# Patient Record
Sex: FEMALE | Race: WHITE | Employment: FULL TIME | URBAN - METROPOLITAN AREA
[De-identification: names, ages, dates, MRNs, and addresses within clinical notes are randomized per-mention and may not be internally consistent; named-entity substitution may affect disease eponyms.]

---

## 2019-01-14 ENCOUNTER — HOSPITAL ENCOUNTER (OUTPATIENT)
Dept: PHYSICAL THERAPY | Age: 49
Discharge: HOME | End: 2019-01-14
Payer: COMMERCIAL

## 2019-01-14 PROCEDURE — 97162 PT EVAL MOD COMPLEX 30 MIN: CPT

## 2019-01-14 NOTE — THERAPY EVALUATION
John Olivo  : 1970  Primary: [facility]  Secondary:  [facility] at [address]  [phone]   [phone]        OUTPATIENT PHYSICAL THERAPY:Initial Assessment and Daily Note 2019   ICD-10: Treatment Diagnosis: pain in joint, left knee M25.562  ICD-10: Treatment Diagnosis: patellar tendonitis M76.52  ICD-10: Treatment Diagnosis: muscle weakness; generalized M62.81  ICD-10: Treatment Diagnosis: difficulty walking R26.2  Precautions/Allergies:   Patient has no known allergies. MD Orders: evaluate and treat MEDICAL/REFERRING DIAGNOSIS:  Left knee pain [M25.562]   DATE OF ONSET: 18 months  REFERRING PHYSICIAN: Chandler Hamilton., *  RETURN PHYSICIAN APPOINTMENT: as needed     INITIAL ASSESSMENT:  Ms. Fernandez Patton is a 46 y.o. female who presents to physical therapy with chronic left knee pain over the past 18 months. She notes increased swelling and instability in her left knee. She has tried several rounds of injections, however no pain relief noted. 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 She would benefit from skilled physical therapy to improve her overall mobility and function with daily tasks. PROBLEM LIST (Impacting functional limitations):  1. Decreased Strength  2. Decreased ADL/Functional Activities  3. Decreased Transfer Abilities  4. Decreased Ambulation Ability/Technique  5. Decreased Balance  6. Increased Pain  7. Decreased Activity Tolerance  8. Decreased Flexibility/Joint Mobility INTERVENTIONS PLANNED: (Treatment may consist of any combination of the following)  1. Balance Exercise  2. Cold  3. Gait Training  4. Heat  5. Home Exercise Program (HEP)  6. Manual Therapy  7. Neuromuscular Re-education/Strengthening  8. Range of Motion (ROM)  9. Therapeutic Activites  10.  Therapeutic Exercise/Strengthening   TREATMENT PLAN:  Effective Dates: 2019 TO 3/16/2019 (60 days). Frequency/Duration: 2 times a week for 60 Day(s)  GOALS: (Goals have been discussed and agreed upon with patient.)    Discharge Goals: Time Frame: 60 days  1. Patient demonstrates independence with her HEP without verbal cueing from therapist.  2. Patient able to ambulate for 45 minutes without giving way in left knee. 3. Patient able to stand for 1 hour to perform work and home duties with pain no more than 0-2/10 in left knee. 4. Improve LEFS outcome measure score from 64/80 to 74/80 to perform daily tasks. Rehabilitation Potential For Stated Goals: Excellent  Regarding Roya Briones's therapy, I certify that the treatment plan above will be carried out by a therapist or under their direction. Thank you for this referral,  Cristo Maher PT                 The information in this section was collected on 19 (except where otherwise noted). HISTORY:   History of Present Injury/Illness (Reason for Referral): Insidious onset of left knee pain that started 18 months ago. She has noted increased swelling in left knee after prolonged weight bearing, giving way in her knee 1-2x a week and increased pain levels with activities. She has tried cortisone injections, euflexxa injections and durolane injections, however has not received any relief. She is not a surgical candidate at this time. She is most challenged with prolonged standing and walking. She constantly feels pops and cracks in her left knee. Patient denies dizziness, drop attacks, numbness/tingling, bowel/bladder dysfunction and/or unexplained weight gain/loss. Current x-rays and MRI are negative, show arthritic changes. Past Medical History/Comorbidities:   Ms. Jeet Geiger  has a past medical history of Adhesive capsulitis of shoulder and Chronic pain.   Ms. Jeet Geiger  has a past surgical history that includes hx  section (); hx hysterectomy (2002); hx lap cholecystectomy (2001); hx shoulder arthroscopy (2012); MANIPULATION (Right, 8/23/2013); SHOULDER ARTHROSCOPY (Right, 10/18/2012); and MANIPULATION (Right, 2/8/2012). 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 Dominant Side:         RIGHT     Ambulatory/Rehab Services H2 Model Falls Risk Assessment    Risk Factors:       No Risk Factors Identified Ability to Rise from Chair:       (1)  Pushes up, successful in one attempt    Falls Prevention Plan:       No modifications necessary   Total: (5 or greater = High Risk): 1    ©2010 [facility] [employer]. All Rights Reserved. [facility] Patent #[id number]. Federal Law prohibits the replication, distribution or use without written permission from [facility] [employer]     Current Medications:       Current Outpatient Medications:     Naproxen-Esomeprazole Mag (VIMOVO) 500-20 mg TbID, Take 1 Tab by mouth two (2) times daily as needed. , Disp: , Rfl:     cyclobenzaprine (FLEXERIL) 10 mg tablet, Take 10 mg by mouth nightly., Disp: , Rfl:     multivitamin (ONE A DAY) tablet, Take 1 Tab by mouth daily. , Disp: , Rfl:     omega-3 fatty acids-vitamin e (FISH OIL) 1,000 mg cap, Take 1 Cap by mouth.  , Disp: , Rfl:     calcium-vitamin D (OYSTER SHELL) 500 mg(1,250mg) -200 unit per tablet, Take 1 Tab by mouth two (2) times daily (with meals). , Disp: , Rfl:    Date Last Reviewed:  1/14/2019   Number of Personal Factors/Comorbidities that affect the Plan of Care: 1-2: MODERATE COMPLEXITY   EXAMINATION:   Observation/Orthostatic Postural Assessment:           Lower extremity weight bearing is slight increased right.  Observation of gait indicates antalgic gait with limited stance phase left, bilateral pelvic rotation, left greater than right. Patient exhibits a increased lumbar lordosis and neutral thoracic kyphosis. Palpation of lower quadrant bony landmarks are left iliac crest elevated, level greater trochanters. Knee alignment is mild genu valgus and recurvatum left, left femur internally rotated. Observation of patellar position indicates lateral tilt. Patellar tracking with short knee bend indicates left knee tracts laterally. Medial longitudinal arch is slight elevated bilaterally. . Functional and full squat exhibits increased strain to anterior aspect of bilateral patella, no posterior weight distribution. Single leg stand exhibits increased left femur internal rotation. Soft tissue observation indicates restrictions in bilateral iliopsoas, left rectus femoris, bilateral hamstrings. Palpation: Patient exhibits tenderness to palpation left lateral knee. In modified Abdullahi position, muscle length of tensor fascia lázaro (TFL)  is restricted left; muscle length of iliopsoas is restricted bilateraly; and muscle length of rectus femoris is restricted left greater than right. Hamstring flexibility tested supine with straight leg raise is restricted bilaterally to 50 degrees. Piriformis flexibility is restricted left greater than right. Quadriceps flexibility tested heel to buttock is restricted left greater than right. Muscle length of gastrocnemius is mild restrictions left. Muscle length of soleus is mild restrictions left. ROM:   Passive Accessory Mobility Testing: Patellofemoral mobility is limited in medial/lateral glides.    AROM(PROM) Right Left   Knee flexion 0-130 0-125   Knee extension 0 0   Hip flexion 110 100   Hip external rotation (ER) 25 20   Hip internal rotation (IR) 20 15   Hip extension 5 5     Strength:     Manual Muscle Test (*/5) Right Left   Knee extension 4 4-   Knee flexion 4+ 4-   Hip flexion 4 4-   Hip ER 4 4   Hip IR 4 4   Hip extension 4- 3+   Hip abduction 4- 3+   Hip adduction 5 5   Ankle DF 5 5   Ankle PF 5 5            Special Tests:    Ligament stress tests performed today of the left knee include:  Valgus Stress Test at 0 and 30 degrees for medial instability: negative. Varus Stress Test at 0 and 30 degrees for lateral instability: negative. One-plane anterior stress for anterior instability: Lachman Test - negative. Meniscal testing performed today of the left knee include: Ally's - negative. Frank Holly test is negative. Scour test is negative. Neurological Screen:  Myotomes: Key muscle strength testing for bilateral LE is intact. Dermatomes: Sensation testing through bilateral LE for light touch is intact. Reflexes: Patellar (L4) and achilles (S1) are not tested today. Neural tension tests: Slump test is negative. Passive straight leg raise (SLR) test is negative. Functional Mobility:  Challenged with prolonged weight bearing, ambulation and ascending/descending stairs. Body Structures Involved:  1. Joints  2. Muscles Body Functions Affected:  1. Sensory/Pain  2. Neuromusculoskeletal  3. Movement Related Activities and Participation Affected:  1. Mobility  2. Self Care  3. Interpersonal Interactions and Relationships  4. Community, Social and [place]   Number of elements (examined above) that affect the Plan of Care: 3: MODERATE COMPLEXITY   CLINICAL PRESENTATION:   Presentation: Evolving clinical presentation with changing clinical characteristics: MODERATE COMPLEXITY   CLINICAL DECISION MAKING:   Outcome Measure: Tool Used: Lower Extremity Functional Scale (LEFS)  Score:  Initial: 64/80 Most Recent: X/80 (Date: -- )   Interpretation of Score: 20 questions each scored on a 5 point scale with 0 representing \"extreme difficulty or unable to perform\" and 4 representing \"no difficulty\". The lower the score, the greater the functional disability. 80/80 represents no disability.   Minimal detectable change is 9 points. Medical Necessity:   · Patient is expected to demonstrate progress in strength, range of motion, balance, coordination and functional technique to increase independence with ability to perform more activities in weight bearing and ambulate without pain or giving way of her left LE. Esa Mcelroy Reason for Services/Other Comments:  · Patient continues to require skilled intervention due to challenged with daily left knee pain and limitations to perform daily tasks and ambulation secondary to pain and instability in left knee. .   Use of outcome tool(s) and clinical judgement create a POC that gives a: Questionable prediction of patient's progress: MODERATE COMPLEXITY            TREATMENT:   (In addition to Assessment/Re-Assessment sessions the following treatments were rendered)  Pre-treatment Symptoms/Complaints:  Patient looking forward to therapy for pain relief and increased strength and endurance in her LE. Pain: Initial:   Pain Intensity 1: 8  Pain Location 1: Knee  Pain Orientation 1: Left  Post Session:  7/10     Therapeutic Exercise: (5 Minutes):  Exercises per grid below to improve mobility, strength, balance and coordination. Required moderate verbal and manual cues to promote proper body alignment, promote proper body posture, promote proper body mechanics and promote proper body breathing techniques. Progressed resistance, range, repetitions and complexity of movement as indicated. Date:  1/14/2019   Activity/Exercise Parameters   Quadriceps sets X 5 reps 5 sec holds   Straight leg raise X x 10 rep, 5 sec holds   Bilateral hamstrings active stretch X 5 reps, 10 second ankle pumps. Manual Therapy (    Soft Tissue Mobilization Duration  Duration: 5 Minutes): Manual techniques to facilitate improved motion and decreased pain.  (Used abbreviations: MET - muscle energy technique; PNF - proprioceptive neuromuscular facilitation; NMR - neuromuscular re-education; a/p - anterior to posterior; p/a - posterior to anterior; FMP - functional movement patterns)   · Supine left anterior quadriceps, superficial fascia with FMP of knee extension  · Supine left patella mobilizations in all directions. [employer] Portal  Treatment/Session Assessment:    · Response to Treatment:  Improved awareness of plan of care for physical therapy, demonstrates exercises well. Secondary to poor activation of neuro-muscular control and core activation, patient would benefit from manual therapy techniques and neuromuscular re-education as well as strength and endurance training. · Compliance with Program/Exercises: Compliant all of the time, Will assess as treatment progresses. · Recommendations/Intent for next treatment session: \"Next visit will focus on advancements to more challenging activities\". Total Treatment Duration: 60 minutes: 50 minutes evaluation and 10 minutes treatment.   PT Patient Time In/Time Out  Time In: 1145  Time Out: [address], PT    Future Appointments   Date Time Provider Avery Jhaveri   1/17/2019 11:30 AM Nicola ALCANTARA, PT [facility]   1/29/2019  7:30 AM Nicola ALCANTARA, PT [facility]   2/4/2019  9:30 AM Nash Egan, PT [facility]   2/6/2019  7:30 AM Nash Egan, PT [facility]

## 2019-01-17 ENCOUNTER — HOSPITAL ENCOUNTER (OUTPATIENT)
Dept: PHYSICAL THERAPY | Age: 49
Discharge: HOME | End: 2019-01-17
Payer: COMMERCIAL

## 2019-01-17 PROCEDURE — 97140 MANUAL THERAPY 1/> REGIONS: CPT

## 2019-01-17 PROCEDURE — 97110 THERAPEUTIC EXERCISES: CPT

## 2019-01-17 NOTE — PROGRESS NOTES
Breanne Jones : 1970 Primary: [facility] Secondary:  Therapy Center at [address] [phone]   [phone] OUTPATIENT PHYSICAL THERAPY:Daily Note 2019 ICD-10: Treatment Diagnosis: pain in joint, left knee M25.562 ICD-10: Treatment Diagnosis: patellar tendonitis M76.52 
ICD-10: Treatment Diagnosis: muscle weakness; generalized M62.81 ICD-10: Treatment Diagnosis: difficulty walking R26.2 Precautions/Allergies:  
Patient has no known allergies. MD Orders: evaluate and treat MEDICAL/REFERRING DIAGNOSIS: 
Left knee pain [M25.562] DATE OF ONSET: 18 months REFERRING PHYSICIAN: Lon Herrera., * RETURN PHYSICIAN APPOINTMENT: as needed INITIAL ASSESSMENT:  Ms. Mary Mendoza is a 46 y.o. female who presents to physical therapy with chronic left knee pain over the past 18 months. She notes increased swelling and instability in her left knee. She has tried several rounds of injections, however no pain relief noted. 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 She would benefit from skilled physical therapy to improve her overall mobility and function with daily tasks. PROBLEM LIST (Impacting functional limitations): 1. Decreased Strength 2. Decreased ADL/Functional Activities 3. Decreased Transfer Abilities 4. Decreased Ambulation Ability/Technique 5. Decreased Balance 6. Increased Pain 7. Decreased Activity Tolerance 8. Decreased Flexibility/Joint Mobility INTERVENTIONS PLANNED: (Treatment may consist of any combination of the following) 1. Balance Exercise 2. Cold 3. Gait Training 4. Heat 5. Home Exercise Program (HEP) 6. Manual Therapy 7. Neuromuscular Re-education/Strengthening 8. Range of Motion (ROM) 9. Therapeutic Activites 10. Therapeutic Exercise/Strengthening TREATMENT PLAN: 
Effective Dates: 1/14/2019 TO 3/16/2019 (60 days). Frequency/Duration: 2 times a week for 60 Day(s) GOALS: (Goals have been discussed and agreed upon with patient.) Discharge Goals: Time Frame: 60 days 1. Patient demonstrates independence with her HEP without verbal cueing from therapist. 
2. Patient able to ambulate for 45 minutes without giving way in left knee. 3. Patient able to stand for 1 hour to perform work and home duties with pain no more than 0-2/10 in left knee. 4. Improve LEFS outcome measure score from 64/80 to 74/80 to perform daily tasks. Rehabilitation Potential For Stated Goals: Excellent Regarding Boo Briones's therapy, I certify that the treatment plan above will be carried out by a therapist or under their direction. Thank you for this referral, Jenniffer Kowalski PT The information in this section was collected on 1/14/19 (except where otherwise noted). HISTORY:  
History of Present Injury/Illness (Reason for Referral): Insidious onset of left knee pain that started 18 months ago. She has noted increased swelling in left knee after prolonged weight bearing, giving way in her knee 1-2x a week and increased pain levels with activities. She has tried cortisone injections, euflexxa injections and durolane injections, however has not received any relief. She is not a surgical candidate at this time. She is most challenged with prolonged standing and walking. She constantly feels pops and cracks in her left knee. Patient denies dizziness, drop attacks, numbness/tingling, bowel/bladder dysfunction and/or unexplained weight gain/loss. Current x-rays and MRI are negative, show arthritic changes. Past Medical History/Comorbidities: Ms. Kushal Armendariz  has a past medical history of Adhesive capsulitis of shoulder (10/18/2012) and Chronic pain.  She also has no past medical history of Aneurysm ([address] Arrhythmia, Asthma, Autoimmune disease ([address] CAD (coronary artery disease), Cancer ([address] Chronic kidney disease, Coagulation defects, COPD, Diabetes ([address] Difficult intubation, GERD (gastroesophageal reflux disease), Heart failure ([address] Hypertension, Liver disease, Malignant hyperthermia due to anesthesia, Morbid obesity ([address] Nausea & vomiting, Other ill-defined conditions(799.89), Pseudocholinesterase deficiency, Psychiatric disorder, PUD (peptic ulcer disease), Seizures ([address] Stroke ([address] Thromboembolus ([address] Thyroid disease, Unspecified adverse effect of anesthesia, or Unspecified sleep apnea. Ms. Samantha Wheeler  has a past surgical history that includes hx  section (); hx hysterectomy (); hx lap cholecystectomy (); and hx shoulder arthroscopy (). Social History/Living Environment:  
  Lives in a private home independently, challenged with prolonged weight bearing activities around the house and difficulty with stairs Prior Level of Function/Work/Activity: 
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 Dominant Side:  
      RIGHT Ambulatory/Rehab Services H2 Model Falls Risk Assessment Risk Factors: 
     No Risk Factors Identified Ability to Rise from Chair: 
     (1)  Pushes up, successful in one attempt Falls Prevention Plan: No modifications necessary Total: (5 or greater = High Risk): 1  [facility] [place] Patent #[id number]. Federal Law prohibits the replication, distribution or use without written permission from [facility] [employer] Current Medications:   
  
Current Outpatient Medications:  
  Naproxen-Esomeprazole Mag (VIMOVO) 500-20 mg TbID, Take 1 Tab by mouth two (2) times daily as needed. , Disp: , Rfl:  
  cyclobenzaprine (FLEXERIL) 10 mg tablet, Take 10 mg by mouth nightly., Disp: , Rfl:  
   multivitamin (ONE A DAY) tablet, Take 1 Tab by mouth daily. , Disp: , Rfl:  
  omega-3 fatty acids-vitamin e (FISH OIL) 1,000 mg cap, Take 1 Cap by mouth.  , Disp: , Rfl:  
  calcium-vitamin D (OYSTER SHELL) 500 mg(1,250mg) -200 unit per tablet, Take 1 Tab by mouth two (2) times daily (with meals). , Disp: , Rfl:   
Date Last Reviewed:  1/17/2019 Number of Personal Factors/Comorbidities that affect the Plan of Care: 1-2: MODERATE COMPLEXITY EXAMINATION:  
Observation/Orthostatic Postural Assessment:   
       Lower extremity weight bearing is slight increased right. Observation of gait indicates antalgic gait with limited stance phase left, bilateral pelvic rotation, left greater than right. Patient exhibits a increased lumbar lordosis and neutral thoracic kyphosis. Palpation of lower quadrant bony landmarks are left iliac crest elevated, level greater trochanters. Knee alignment is mild genu valgus and recurvatum left, left femur internally rotated. Observation of patellar position indicates lateral tilt. Patellar tracking with short knee bend indicates left knee tracts laterally. Medial longitudinal arch is slight elevated bilaterally. . Functional and full squat exhibits increased strain to anterior aspect of bilateral patella, no posterior weight distribution. Single leg stand exhibits increased left femur internal rotation. Soft tissue observation indicates restrictions in bilateral iliopsoas, left rectus femoris, bilateral hamstrings. Palpation: Patient exhibits tenderness to palpation left lateral knee. In modified Abdullahi position, muscle length of tensor fascia lázaro (TFL)  is restricted left; muscle length of iliopsoas is restricted bilateraly; and muscle length of rectus femoris is restricted left greater than right. Hamstring flexibility tested supine with straight leg raise is restricted bilaterally to 50 degrees.  Piriformis flexibility is restricted left greater than right. Quadriceps flexibility tested heel to buttock is restricted left greater than right. Muscle length of gastrocnemius is mild restrictions left. Muscle length of soleus is mild restrictions left. ROM:   Passive Accessory Mobility Testing: Patellofemoral mobility is limited in medial/lateral glides. AROM(PROM) Right Left Knee flexion 0-130 0-125 Knee extension 0 0 Hip flexion 110 100 Hip external rotation (ER) 25 20 Hip internal rotation (IR) 20 15 Hip extension 5 5 Strength:    
Manual Muscle Test (*/5) Right Left Knee extension 4 4-  
Knee flexion 4+ 4- Hip flexion 4 4- Hip ER 4 4 Hip IR 4 4 Hip extension 4- 3+ Hip abduction 4- 3+ Hip adduction 5 5 Ankle DF 5 5 Ankle PF 5 5 Special Tests:   
Ligament stress tests performed today of the left knee include: 
Valgus Stress Test at 0 and 30 degrees for medial instability: negative. Varus Stress Test at 0 and 30 degrees for lateral instability: negative. One-plane anterior stress for anterior instability: Lachman Test - negative. Meniscal testing performed today of the left knee include: Ally's - negative. Manus Baba test is negative. Scour test is negative. Neurological Screen: Myotomes: Key muscle strength testing for bilateral LE is intact. Dermatomes: Sensation testing through bilateral LE for light touch is intact. Reflexes: Patellar (L4) and achilles (S1) are not tested today. Neural tension tests: Slump test is negative. Passive straight leg raise (SLR) test is negative. Functional Mobility:  Challenged with prolonged weight bearing, ambulation and ascending/descending stairs. Body Structures Involved: 1. Joints 2. Muscles Body Functions Affected: 1. Sensory/Pain 2. Neuromusculoskeletal 
3. Movement Related Activities and Participation Affected: 1. Mobility 2. Self Care 3. Interpersonal Interactions and Relationships 4. Community, Social and [place] Number of elements (examined above) that affect the Plan of Care: 3: MODERATE COMPLEXITY CLINICAL PRESENTATION:  
Presentation: Evolving clinical presentation with changing clinical characteristics: MODERATE COMPLEXITY CLINICAL DECISION MAKING:  
Outcome Measure: Tool Used: Lower Extremity Functional Scale (LEFS) Score:  Initial: 64/80 Most Recent: X/80 (Date: -- ) Interpretation of Score: 20 questions each scored on a 5 point scale with 0 representing \"extreme difficulty or unable to perform\" and 4 representing \"no difficulty\". The lower the score, the greater the functional disability. 80/80 represents no disability. Minimal detectable change is 9 points. Medical Necessity:  
· Patient is expected to demonstrate progress in strength, range of motion, balance, coordination and functional technique to increase independence with ability to perform more activities in weight bearing and ambulate without pain or giving way of her left LE. Saumya Cool Reason for Services/Other Comments: 
· Patient continues to require skilled intervention due to challenged with daily left knee pain and limitations to perform daily tasks and ambulation secondary to pain and instability in left knee. Saumya Cool Use of outcome tool(s) and clinical judgement create a POC that gives a: Questionable prediction of patient's progress: MODERATE COMPLEXITY  
  
 
 
 
TREATMENT:  
(In addition to Assessment/Re-Assessment sessions the following treatments were rendered) Pre-treatment Symptoms/Complaints:  Patient notes left patella and lateral knee continue to have sharp pains with stairs. Pain: Initial:  
Pain Intensity 1: 7 Pain Location 1: Knee Pain Orientation 1: Left  Post Session:  6/10 Therapeutic Exercise: (30 Minutes):  Exercises per grid below to improve mobility, strength, balance and coordination.   Required moderate verbal and manual cues to promote proper body alignment, promote proper body posture, promote proper body mechanics and promote proper body breathing techniques. Progressed resistance, range, repetitions and complexity of movement as indicated. Date: 
1/17/2019 Activity/Exercise Parameters Quadriceps sets X 5 reps 5 sec holds Straight leg raise X x 10 rep, 5 sec holds Bilateral hamstrings active stretch X 5 reps, 10 second ankle pumps. Kinesiotaping X 5 minute application to left knee for patella support Bridging 2x10 reps, 5 sec holds Side lying clam shells 2x10 reps, 5 sec holds Standing forward weight shift X 10 reps, 5 sec holds Manual Therapy (    Soft Tissue Mobilization Duration Duration: 30 Minutes): Manual techniques to facilitate improved motion and decreased pain. (Used abbreviations: MET - muscle energy technique; PNF - proprioceptive neuromuscular facilitation; NMR - neuromuscular re-education; a/p - anterior to posterior; p/a - posterior to anterior; FMP - functional movement patterns) · Supine left anterior quadriceps, superficial fascia with FMP of knee extension · Supine left patella mobilizations in all directions. · Supine left IT band and vastus lateralis soft tissue mobilization with FMP of knee flexion/extension [employer] [facility] 
Treatment/Session Assessment:   
· Response to Treatment:  Improved mobility in weight bearing with use of kinesiotape to left patella, less sharp pain noted. · Compliance with Program/Exercises: Compliant all of the time, Will assess as treatment progresses. · Recommendations/Intent for next treatment session: \"Next visit will focus on advancements to more challenging activities\". Total Treatment Duration: 60 minutes PT Patient Time In/Time Out Time In: 1130 Time Out: 1230 Derrick Mccarthy PT Future Appointments Date Time Provider Avery Jhaveri 1/29/2019  7:30 AM RHEA Hernández  
2/4/2019  9:30 AM Kaveh Hernadez PT JOAN [facility] 2/6/2019  7:30 AM Vaishnavi Lambert, PT [facility]

## 2019-01-29 ENCOUNTER — HOSPITAL ENCOUNTER (OUTPATIENT)
Dept: PHYSICAL THERAPY | Age: 49
Discharge: HOME | End: 2019-01-29
Payer: COMMERCIAL

## 2019-01-29 PROCEDURE — 97140 MANUAL THERAPY 1/> REGIONS: CPT

## 2019-01-29 PROCEDURE — 97110 THERAPEUTIC EXERCISES: CPT

## 2019-01-29 NOTE — PROGRESS NOTES
Erlindathania Eli : 1970 Primary: [facility] [employer] [facility]* Secondary:  Therapy Center at [address] [phone]   [phone] OUTPATIENT PHYSICAL THERAPY:Daily Note 2019 ICD-10: Treatment Diagnosis: pain in joint, left knee M25.562 ICD-10: Treatment Diagnosis: patellar tendonitis M76.52 
ICD-10: Treatment Diagnosis: muscle weakness; generalized M62.81 ICD-10: Treatment Diagnosis: difficulty walking R26.2 Precautions/Allergies:  
Patient has no known allergies. MD Orders: evaluate and treat MEDICAL/REFERRING DIAGNOSIS: 
Left knee pain [M25.562] DATE OF ONSET: 18 months REFERRING PHYSICIAN: Gela Maldonado., * RETURN PHYSICIAN APPOINTMENT: as needed INITIAL ASSESSMENT:  Ms. Adelso Bocanegra is a 46 y.o. female who presents to physical therapy with chronic left knee pain over the past 18 months. She notes increased swelling and instability in her left knee. She has tried several rounds of injections, however no pain relief noted. 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 She would benefit from skilled physical therapy to improve her overall mobility and function with daily tasks. PROBLEM LIST (Impacting functional limitations): 1. Decreased Strength 2. Decreased ADL/Functional Activities 3. Decreased Transfer Abilities 4. Decreased Ambulation Ability/Technique 5. Decreased Balance 6. Increased Pain 7. Decreased Activity Tolerance 8. Decreased Flexibility/Joint Mobility INTERVENTIONS PLANNED: (Treatment may consist of any combination of the following) 1. Balance Exercise 2. Cold 3. Gait Training 4. Heat 5. Home Exercise Program (HEP) 6. Manual Therapy 7. Neuromuscular Re-education/Strengthening 8. Range of Motion (ROM) 9. Therapeutic Activites 10. Therapeutic Exercise/Strengthening TREATMENT PLAN: 
Effective Dates: 1/14/2019 TO 3/16/2019 (60 days). Frequency/Duration: 2 times a week for 60 Day(s) GOALS: (Goals have been discussed and agreed upon with patient.) Discharge Goals: Time Frame: 60 days 1. Patient demonstrates independence with her HEP without verbal cueing from therapist. 
2. Patient able to ambulate for 45 minutes without giving way in left knee. 3. Patient able to stand for 1 hour to perform work and home duties with pain no more than 0-2/10 in left knee. 4. Improve LEFS outcome measure score from 64/80 to 74/80 to perform daily tasks. Rehabilitation Potential For Stated Goals: Excellent Regarding Karon Briones's therapy, I certify that the treatment plan above will be carried out by a therapist or under their direction. Thank you for this referral, Caro Reyes PT The information in this section was collected on 1/14/19 (except where otherwise noted). HISTORY:  
History of Present Injury/Illness (Reason for Referral): Insidious onset of left knee pain that started 18 months ago. She has noted increased swelling in left knee after prolonged weight bearing, giving way in her knee 1-2x a week and increased pain levels with activities. She has tried cortisone injections, euflexxa injections and durolane injections, however has not received any relief. She is not a surgical candidate at this time. She is most challenged with prolonged standing and walking. She constantly feels pops and cracks in her left knee. Patient denies dizziness, drop attacks, numbness/tingling, bowel/bladder dysfunction and/or unexplained weight gain/loss. Current x-rays and MRI are negative, show arthritic changes. Past Medical History/Comorbidities: Ms. Mary Mendoza  has a past medical history of Adhesive capsulitis of shoulder (10/18/2012) and Chronic pain.  She also has no past medical history of Aneurysm ([address] Arrhythmia, Asthma, Autoimmune disease ([address] CAD (coronary artery disease), Cancer ([facility] [address] Chronic kidney disease, Coagulation defects, COPD, Diabetes ([address] Difficult intubation, GERD (gastroesophageal reflux disease), Heart failure ([address] Hypertension, Liver disease, Malignant hyperthermia due to anesthesia, Morbid obesity ([facility] [address] Nausea & vomiting, Other ill-defined conditions(799.89), Pseudocholinesterase deficiency, Psychiatric disorder, PUD (peptic ulcer disease), Seizures ([address] Stroke ([address] Thromboembolus ([facility] [address] Thyroid disease, Unspecified adverse effect of anesthesia, or Unspecified sleep apnea. Ms. Sonia Lopez  has a past surgical history that includes hx  section (); hx hysterectomy (); hx lap cholecystectomy (); and hx shoulder arthroscopy (). Social History/Living Environment:  
  Lives in a private home independently, challenged with prolonged weight bearing activities around the house and difficulty with stairs Prior Level of Function/Work/Activity: 
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 Dominant Side:  
      RIGHT Ambulatory/Rehab Services H2 Model Falls Risk Assessment Risk Factors: 
     No Risk Factors Identified Ability to Rise from Chair: 
     (1)  Pushes up, successful in one attempt Falls Prevention Plan: No modifications necessary Total: (5 or greater = High Risk): 1  [facility] [address] Patent #[id number]. Federal Law prohibits the replication, distribution or use without written permission from [facility] [employer] Current Medications:   
  
Current Outpatient Medications:  
  Naproxen-Esomeprazole Mag (VIMOVO) 500-20 mg TbID, Take 1 Tab by mouth two (2) times daily as needed. , Disp: , Rfl:  
  cyclobenzaprine (FLEXERIL) 10 mg tablet, Take 10 mg by mouth nightly., Disp: , Rfl:  
   multivitamin (ONE A DAY) tablet, Take 1 Tab by mouth daily. , Disp: , Rfl:  
  omega-3 fatty acids-vitamin e (FISH OIL) 1,000 mg cap, Take 1 Cap by mouth.  , Disp: , Rfl:  
  calcium-vitamin D (OYSTER SHELL) 500 mg(1,250mg) -200 unit per tablet, Take 1 Tab by mouth two (2) times daily (with meals). , Disp: , Rfl:   
Date Last Reviewed:  1/29/2019 Number of Personal Factors/Comorbidities that affect the Plan of Care: 1-2: MODERATE COMPLEXITY EXAMINATION:  
Observation/Orthostatic Postural Assessment:   
       Lower extremity weight bearing is slight increased right. Observation of gait indicates antalgic gait with limited stance phase left, bilateral pelvic rotation, left greater than right. Patient exhibits a increased lumbar lordosis and neutral thoracic kyphosis. Palpation of lower quadrant bony landmarks are left iliac crest elevated, level greater trochanters. Knee alignment is mild genu valgus and recurvatum left, left femur internally rotated. Observation of patellar position indicates lateral tilt. Patellar tracking with short knee bend indicates left knee tracts laterally. Medial longitudinal arch is slight elevated bilaterally. . Functional and full squat exhibits increased strain to anterior aspect of bilateral patella, no posterior weight distribution. Single leg stand exhibits increased left femur internal rotation. Soft tissue observation indicates restrictions in bilateral iliopsoas, left rectus femoris, bilateral hamstrings. Palpation: Patient exhibits tenderness to palpation left lateral knee. In modified Abdullahi position, muscle length of tensor fascia lázaro (TFL)  is restricted left; muscle length of iliopsoas is restricted bilateraly; and muscle length of rectus femoris is restricted left greater than right. Hamstring flexibility tested supine with straight leg raise is restricted bilaterally to 50 degrees.  Piriformis flexibility is restricted left greater than right. Quadriceps flexibility tested heel to buttock is restricted left greater than right. Muscle length of gastrocnemius is mild restrictions left. Muscle length of soleus is mild restrictions left. ROM:   Passive Accessory Mobility Testing: Patellofemoral mobility is limited in medial/lateral glides. AROM(PROM) Right Left Knee flexion 0-130 0-125 Knee extension 0 0 Hip flexion 110 100 Hip external rotation (ER) 25 20 Hip internal rotation (IR) 20 15 Hip extension 5 5 Strength:    
Manual Muscle Test (*/5) Right Left Knee extension 4 4-  
Knee flexion 4+ 4- Hip flexion 4 4- Hip ER 4 4 Hip IR 4 4 Hip extension 4- 3+ Hip abduction 4- 3+ Hip adduction 5 5 Ankle DF 5 5 Ankle PF 5 5 Special Tests:   
Ligament stress tests performed today of the left knee include: 
Valgus Stress Test at 0 and 30 degrees for medial instability: negative. Varus Stress Test at 0 and 30 degrees for lateral instability: negative. One-plane anterior stress for anterior instability: Lachman Test - negative. Meniscal testing performed today of the left knee include: Ally's - negative. Patricia Conception test is negative. Scour test is negative. Neurological Screen: Myotomes: Key muscle strength testing for bilateral LE is intact. Dermatomes: Sensation testing through bilateral LE for light touch is intact. Reflexes: Patellar (L4) and achilles (S1) are not tested today. Neural tension tests: Slump test is negative. Passive straight leg raise (SLR) test is negative. Functional Mobility:  Challenged with prolonged weight bearing, ambulation and ascending/descending stairs. Body Structures Involved: 1. Joints 2. Muscles Body Functions Affected: 1. Sensory/Pain 2. Neuromusculoskeletal 
3. Movement Related Activities and Participation Affected: 1. Mobility 2. Self Care 3. Interpersonal Interactions and Relationships 4. Community, Social and [place] Number of elements (examined above) that affect the Plan of Care: 3: MODERATE COMPLEXITY CLINICAL PRESENTATION:  
Presentation: Evolving clinical presentation with changing clinical characteristics: MODERATE COMPLEXITY CLINICAL DECISION MAKING:  
Outcome Measure: Tool Used: Lower Extremity Functional Scale (LEFS) Score:  Initial: 64/80 Most Recent: X/80 (Date: -- ) Interpretation of Score: 20 questions each scored on a 5 point scale with 0 representing \"extreme difficulty or unable to perform\" and 4 representing \"no difficulty\". The lower the score, the greater the functional disability. 80/80 represents no disability. Minimal detectable change is 9 points. Medical Necessity:  
· Patient is expected to demonstrate progress in strength, range of motion, balance, coordination and functional technique to increase independence with ability to perform more activities in weight bearing and ambulate without pain or giving way of her left LE. Viv Rich Reason for Services/Other Comments: 
· Patient continues to require skilled intervention due to challenged with daily left knee pain and limitations to perform daily tasks and ambulation secondary to pain and instability in left knee. Viv Rich Use of outcome tool(s) and clinical judgement create a POC that gives a: Questionable prediction of patient's progress: MODERATE COMPLEXITY  
  
 
 
 
TREATMENT:  
(In addition to Assessment/Re-Assessment sessions the following treatments were rendered) Pre-treatment Symptoms/Complaints:  Patient notes traveled last week for work and noted increased left knee pain and stiffness with prolonged driving and sitting. Pain: Initial:  
Pain Intensity 1: 7 Pain Location 1: Knee Pain Orientation 1: Left  Post Session:  6/10 Therapeutic Exercise: (20 Minutes):  Exercises per grid below to improve mobility, strength, balance and coordination.   Required moderate verbal and manual cues to promote proper body alignment, promote proper body posture, promote proper body mechanics and promote proper body breathing techniques. Progressed resistance, range, repetitions and complexity of movement as indicated. Date: 
1/29/2019 Activity/Exercise Parameters Quadriceps sets X 5 reps 5 sec holds Straight leg raise X x 10 rep, 5 sec holds Bilateral hamstrings active stretch X 5 reps, 10 second ankle pumps. Kinesiotaping Bridging 2x10 reps, 5 sec holds Side lying clam shells 2x10 reps, 5 sec holds Standing forward weight shift X 10 reps, 5 sec holds Side lying hip abduction X 15 reps, 5 sec holds BLE Manual Therapy (    Soft Tissue Mobilization Duration Duration: 25 Minutes): Manual techniques to facilitate improved motion and decreased pain. (Used abbreviations: MET - muscle energy technique; PNF - proprioceptive neuromuscular facilitation; NMR - neuromuscular re-education; a/p - anterior to posterior; p/a - posterior to anterior; FMP - functional movement patterns) · Supine left anterior quadriceps, superficial fascia with FMP of knee extension · Supine left patella mobilizations in all directions. · Supine bilateral iliopsoas release with FMP of knee extension · Supine left IT band and vastus lateralis soft tissue mobilization with FMP of knee flexion/extension [employer] [facility] 
Treatment/Session Assessment:   
· Response to Treatment:  Improved mobility noted in lateral left thigh, improved patella mobility, demonstrates weakness through gluteus medius/minimus · Compliance with Program/Exercises: Compliant all of the time, Will assess as treatment progresses. · Recommendations/Intent for next treatment session: \"Next visit will focus on advancements to more challenging activities\". Total Treatment Duration: 45 minutes PT Patient Time In/Time Out Time In: 0745 Time Out: 0830 Lilia Carrel, PT Future Appointments Date Time Provider Avery Jhaveri 2/4/2019  9:30 AM Christel Lambert., PT [facility] 2/6/2019  7:30 AM William Lambert., PT [facility]

## 2019-01-31 ENCOUNTER — APPOINTMENT (OUTPATIENT)
Dept: PHYSICAL THERAPY | Age: 49
End: 2019-01-31
Payer: COMMERCIAL

## 2019-02-04 ENCOUNTER — HOSPITAL ENCOUNTER (OUTPATIENT)
Dept: PHYSICAL THERAPY | Age: 49
Discharge: HOME | End: 2019-02-04
Payer: COMMERCIAL

## 2019-02-04 PROCEDURE — 97140 MANUAL THERAPY 1/> REGIONS: CPT

## 2019-02-04 PROCEDURE — 97110 THERAPEUTIC EXERCISES: CPT

## 2019-02-04 NOTE — PROGRESS NOTES
Deborah  : 1970 Primary: [facility] Secondary:  Therapy Center at [address] [place] [address], [address] [place] [address] [phone]   [phone] OUTPATIENT PHYSICAL THERAPY:Daily Note 2019 ICD-10: Treatment Diagnosis: pain in joint, left knee M25.562 ICD-10: Treatment Diagnosis: patellar tendonitis M76.52 
ICD-10: Treatment Diagnosis: muscle weakness; generalized M62.81 ICD-10: Treatment Diagnosis: difficulty walking R26.2 Precautions/Allergies:  
Patient has no known allergies. MD Orders: evaluate and treat MEDICAL/REFERRING DIAGNOSIS: 
Left knee pain [M25.562] DATE OF ONSET: 18 months REFERRING PHYSICIAN: Lorenzo Singh., * RETURN PHYSICIAN APPOINTMENT: as needed INITIAL ASSESSMENT:  Ms. Christopher Winslow is a 46 y.o. female who presents to physical therapy with chronic left knee pain over the past 18 months. She notes increased swelling and instability in her left knee. She has tried several rounds of injections, however no pain relief noted. 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 She would benefit from skilled physical therapy to improve her overall mobility and function with daily tasks. PROBLEM LIST (Impacting functional limitations): 1. Decreased Strength 2. Decreased ADL/Functional Activities 3. Decreased Transfer Abilities 4. Decreased Ambulation Ability/Technique 5. Decreased Balance 6. Increased Pain 7. Decreased Activity Tolerance 8. Decreased Flexibility/Joint Mobility INTERVENTIONS PLANNED: (Treatment may consist of any combination of the following) 1. Balance Exercise 2. Cold 3. Gait Training 4. Heat 5. Home Exercise Program (HEP) 6. Manual Therapy 7. Neuromuscular Re-education/Strengthening 8. Range of Motion (ROM) 9. Therapeutic Activites 10. Therapeutic Exercise/Strengthening TREATMENT PLAN: 
Effective Dates: 1/14/2019 TO 3/16/2019 (60 days). Frequency/Duration: 2 times a week for 60 Day(s) GOALS: (Goals have been discussed and agreed upon with patient.) Discharge Goals: Time Frame: 60 days 1. Patient demonstrates independence with her HEP without verbal cueing from therapist. 
2. Patient able to ambulate for 45 minutes without giving way in left knee. 3. Patient able to stand for 1 hour to perform work and home duties with pain no more than 0-2/10 in left knee. 4. Improve LEFS outcome measure score from 64/80 to 74/80 to perform daily tasks. Rehabilitation Potential For Stated Goals: Excellent Regarding Tremaine Briones's therapy, I certify that the treatment plan above will be carried out by a therapist or under their direction. Thank you for this referral, Jadyn Garibay, PT The information in this section was collected on 1/14/19 (except where otherwise noted). HISTORY:  
History of Present Injury/Illness (Reason for Referral): Insidious onset of left knee pain that started 18 months ago. She has noted increased swelling in left knee after prolonged weight bearing, giving way in her knee 1-2x a week and increased pain levels with activities. She has tried cortisone injections, euflexxa injections and durolane injections, however has not received any relief. She is not a surgical candidate at this time. She is most challenged with prolonged standing and walking. She constantly feels pops and cracks in her left knee. Patient denies dizziness, drop attacks, numbness/tingling, bowel/bladder dysfunction and/or unexplained weight gain/loss. Current x-rays and MRI are negative, show arthritic changes. Past Medical History/Comorbidities: Ms. Luz Maria Rodriguez  has a past medical history of Adhesive capsulitis of shoulder (10/18/2012) and Chronic pain.  She also has no past medical history of Aneurysm ([address] Arrhythmia, Asthma, Autoimmune disease ([address] CAD (coronary artery disease), Cancer ([address] Chronic kidney disease, Coagulation defects, COPD, Diabetes ([address] Difficult intubation, GERD (gastroesophageal reflux disease), Heart failure ([address] Hypertension, Liver disease, Malignant hyperthermia due to anesthesia, Morbid obesity ([address] Nausea & vomiting, Other ill-defined conditions(799.89), Pseudocholinesterase deficiency, Psychiatric disorder, PUD (peptic ulcer disease), Seizures ([address] Stroke ([address] Thromboembolus ([address] Thyroid disease, Unspecified adverse effect of anesthesia, or Unspecified sleep apnea. Ms. Kishan Snow  has a past surgical history that includes hx  section (); hx hysterectomy (); hx lap cholecystectomy (); and hx shoulder arthroscopy (). Social History/Living Environment:  
  Lives in a private home independently, challenged with prolonged weight bearing activities around the house and difficulty with stairs Prior Level of Function/Work/Activity: 
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 Dominant Side:  
      RIGHT Ambulatory/Rehab Services H2 Model Falls Risk Assessment Risk Factors: 
     No Risk Factors Identified Ability to Rise from Chair: 
     (1)  Pushes up, successful in one attempt Falls Prevention Plan: No modifications necessary Total: (5 or greater = High Risk): 1  [facility] [address] Patent #[id number]. Federal Law prohibits the replication, distribution or use without written permission from [facility] [employer] Current Medications:   
  
Current Outpatient Medications:  
  Naproxen-Esomeprazole Mag (VIMOVO) 500-20 mg TbID, Take 1 Tab by mouth two (2) times daily as needed. , Disp: , Rfl:  
  cyclobenzaprine (FLEXERIL) 10 mg tablet, Take 10 mg by mouth nightly., Disp: , Rfl:  
   multivitamin (ONE A DAY) tablet, Take 1 Tab by mouth daily. , Disp: , Rfl:  
  omega-3 fatty acids-vitamin e (FISH OIL) 1,000 mg cap, Take 1 Cap by mouth.  , Disp: , Rfl:  
  calcium-vitamin D (OYSTER SHELL) 500 mg(1,250mg) -200 unit per tablet, Take 1 Tab by mouth two (2) times daily (with meals). , Disp: , Rfl:   
Date Last Reviewed:  2/4/2019 Number of Personal Factors/Comorbidities that affect the Plan of Care: 1-2: MODERATE COMPLEXITY EXAMINATION:  
Observation/Orthostatic Postural Assessment:   
       Lower extremity weight bearing is slight increased right. Observation of gait indicates antalgic gait with limited stance phase left, bilateral pelvic rotation, left greater than right. Patient exhibits a increased lumbar lordosis and neutral thoracic kyphosis. Palpation of lower quadrant bony landmarks are left iliac crest elevated, level greater trochanters. Knee alignment is mild genu valgus and recurvatum left, left femur internally rotated. Observation of patellar position indicates lateral tilt. Patellar tracking with short knee bend indicates left knee tracts laterally. Medial longitudinal arch is slight elevated bilaterally. . Functional and full squat exhibits increased strain to anterior aspect of bilateral patella, no posterior weight distribution. Single leg stand exhibits increased left femur internal rotation. Soft tissue observation indicates restrictions in bilateral iliopsoas, left rectus femoris, bilateral hamstrings. Palpation: Patient exhibits tenderness to palpation left lateral knee. In modified Abdullahi position, muscle length of tensor fascia lázaro (TFL)  is restricted left; muscle length of iliopsoas is restricted bilateraly; and muscle length of rectus femoris is restricted left greater than right. Hamstring flexibility tested supine with straight leg raise is restricted bilaterally to 50 degrees.  Piriformis flexibility is restricted left greater than right. Quadriceps flexibility tested heel to buttock is restricted left greater than right. Muscle length of gastrocnemius is mild restrictions left. Muscle length of soleus is mild restrictions left. ROM:   Passive Accessory Mobility Testing: Patellofemoral mobility is limited in medial/lateral glides. AROM(PROM) Right Left Knee flexion 0-130 0-125 Knee extension 0 0 Hip flexion 110 100 Hip external rotation (ER) 25 20 Hip internal rotation (IR) 20 15 Hip extension 5 5 Strength:    
Manual Muscle Test (*/5) Right Left Knee extension 4 4-  
Knee flexion 4+ 4- Hip flexion 4 4- Hip ER 4 4 Hip IR 4 4 Hip extension 4- 3+ Hip abduction 4- 3+ Hip adduction 5 5 Ankle DF 5 5 Ankle PF 5 5 Special Tests:   
Ligament stress tests performed today of the left knee include: 
Valgus Stress Test at 0 and 30 degrees for medial instability: negative. Varus Stress Test at 0 and 30 degrees for lateral instability: negative. One-plane anterior stress for anterior instability: Lachman Test - negative. Meniscal testing performed today of the left knee include: Ally's - negative. Esmeralda Shed test is negative. Scour test is negative. Neurological Screen: Myotomes: Key muscle strength testing for bilateral LE is intact. Dermatomes: Sensation testing through bilateral LE for light touch is intact. Reflexes: Patellar (L4) and achilles (S1) are not tested today. Neural tension tests: Slump test is negative. Passive straight leg raise (SLR) test is negative. Functional Mobility:  Challenged with prolonged weight bearing, ambulation and ascending/descending stairs. Body Structures Involved: 1. Joints 2. Muscles Body Functions Affected: 1. Sensory/Pain 2. Neuromusculoskeletal 
3. Movement Related Activities and Participation Affected: 1. Mobility 2. Self Care 3. Interpersonal Interactions and Relationships 4. Community, Social and [place] Number of elements (examined above) that affect the Plan of Care: 3: MODERATE COMPLEXITY CLINICAL PRESENTATION:  
Presentation: Evolving clinical presentation with changing clinical characteristics: MODERATE COMPLEXITY CLINICAL DECISION MAKING:  
Outcome Measure: Tool Used: Lower Extremity Functional Scale (LEFS) Score:  Initial: 64/80 Most Recent: X/80 (Date: -- ) Interpretation of Score: 20 questions each scored on a 5 point scale with 0 representing \"extreme difficulty or unable to perform\" and 4 representing \"no difficulty\". The lower the score, the greater the functional disability. 80/80 represents no disability. Minimal detectable change is 9 points. Medical Necessity:  
· Patient is expected to demonstrate progress in strength, range of motion, balance, coordination and functional technique to increase independence with ability to perform more activities in weight bearing and ambulate without pain or giving way of her left LE. Patrick Mix Reason for Services/Other Comments: 
· Patient continues to require skilled intervention due to challenged with daily left knee pain and limitations to perform daily tasks and ambulation secondary to pain and instability in left knee. Patrick Mix Use of outcome tool(s) and clinical judgement create a POC that gives a: Questionable prediction of patient's progress: MODERATE COMPLEXITY  
  
 
 
 
TREATMENT:  
(In addition to Assessment/Re-Assessment sessions the following treatments were rendered) Pre-treatment Symptoms/Complaints:  Patient continues to have daily pain in her left knee, notes she was able to stand for longer durations this weekend with less discomfort. Pain: Initial:  
Pain Intensity 1: 6 Pain Location 1: Knee Pain Orientation 1: Left  Post Session:  6/10 Therapeutic Exercise: (30 Minutes):  Exercises per grid below to improve mobility, strength, balance and coordination.   Required moderate verbal and manual cues to promote proper body alignment, promote proper body posture, promote proper body mechanics and promote proper body breathing techniques. Progressed resistance, range, repetitions and complexity of movement as indicated. Date: 
2/4/2019 Activity/Exercise Parameters Quadriceps sets X 5 reps 5 sec holds Straight leg raise X x 10 rep, 5 sec holds Bilateral hamstrings active stretch X 5 reps, 10 second ankle pumps. Kinesiotaping Bridging 2x10 reps, 5 sec holds Side lying clam shells 2x10 reps, 5 sec holds Standing forward weight shift X 10 reps, 5 sec holds Side lying hip abduction X 15 reps, 5 sec holds BLE Standing lateral steps with t-band X 5 reps, green band 1/2 prone hip extension X 20 reps, 5 sec holds, blue t-band Standing hip hinge hamstrings stretch X 5 reps 15 sec holds Patient education X 5 minute review of HEP and encouragement of walking and recumbent bicycle use Manual Therapy (    Soft Tissue Mobilization Duration Duration: 30 Minutes): Manual techniques to facilitate improved motion and decreased pain. (Used abbreviations: MET - muscle energy technique; PNF - proprioceptive neuromuscular facilitation; NMR - neuromuscular re-education; a/p - anterior to posterior; p/a - posterior to anterior; FMP - functional movement patterns) · Supine left anterior quadriceps, superficial fascia with FMP of knee extension · Supine left patella mobilizations in all directions. · Supine bilateral iliopsoas release with FMP of knee extension · Supine left IT band and vastus lateralis soft tissue mobilization with FMP of knee flexion/extension · Side lying right for left IT band distraction with suction, rolfing techniques to lateral thigh. [facility] Portal 
Treatment/Session Assessment:   
· Response to Treatment:  Decreased restrictions noted in left IT band and vastus lateralis, continues to demonstrate weakness in gluteals and hips. · Compliance with Program/Exercises: Compliant all of the time, Will assess as treatment progresses. · Recommendations/Intent for next treatment session: \"Next visit will focus on advancements to more challenging activities\". Total Treatment Duration: 45 minutes PT Patient Time In/Time Out Time In: 0930 Time Out: 1030 Richa Rosales, RHEA Future Appointments Date Time Provider Avery Jhaveri 2/6/2019  7:30 AM Junnie Gutting A., PT [facility]  
3/7/2019  9:30 AM Junnie Gutting A., PT [facility]  
4/4/2019  9:30 AM Nawaf Fonseca., PT [facility]

## 2019-02-06 ENCOUNTER — HOSPITAL ENCOUNTER (OUTPATIENT)
Dept: PHYSICAL THERAPY | Age: 49
Discharge: HOME | End: 2019-02-06
Payer: COMMERCIAL

## 2019-02-06 PROCEDURE — 97140 MANUAL THERAPY 1/> REGIONS: CPT

## 2019-02-06 PROCEDURE — 97110 THERAPEUTIC EXERCISES: CPT

## 2019-02-06 NOTE — PROGRESS NOTES
Edel Crowell : 1970 Primary: [facility] "[email or web address] Of Indira Schmidt* Secondary:  Therapy Center at [address] [phone]   [phone] OUTPATIENT PHYSICAL THERAPY:Daily Note 2019 ICD-10: Treatment Diagnosis: pain in joint, left knee M25.562 ICD-10: Treatment Diagnosis: patellar tendonitis M76.52 
ICD-10: Treatment Diagnosis: muscle weakness; generalized M62.81 ICD-10: Treatment Diagnosis: difficulty walking R26.2 Precautions/Allergies:  
Patient has no known allergies. MD Orders: evaluate and treat MEDICAL/REFERRING DIAGNOSIS: 
Left knee pain [M25.562] DATE OF ONSET: 18 months REFERRING PHYSICIAN: Nalini Wilhelm., * RETURN PHYSICIAN APPOINTMENT: as needed INITIAL ASSESSMENT:  Ms. Herve Holland is a 46 y.o. female who presents to physical therapy with chronic left knee pain over the past 18 months. She notes increased swelling and instability in her left knee. She has tried several rounds of injections, however no pain relief noted. 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 She would benefit from skilled physical therapy to improve her overall mobility and function with daily tasks. PROBLEM LIST (Impacting functional limitations): 1. Decreased Strength 2. Decreased ADL/Functional Activities 3. Decreased Transfer Abilities 4. Decreased Ambulation Ability/Technique 5. Decreased Balance 6. Increased Pain 7. Decreased Activity Tolerance 8. Decreased Flexibility/Joint Mobility INTERVENTIONS PLANNED: (Treatment may consist of any combination of the following) 1. Balance Exercise 2. Cold 3. Gait Training 4. Heat 5. Home Exercise Program (HEP) 6. Manual Therapy 7. Neuromuscular Re-education/Strengthening 8. Range of Motion (ROM) 9. Therapeutic Activites 10. Therapeutic Exercise/Strengthening TREATMENT PLAN: 
Effective Dates: 1/14/2019 TO 3/16/2019 (60 days). Frequency/Duration: 2 times a week for 60 Day(s) GOALS: (Goals have been discussed and agreed upon with patient.) Discharge Goals: Time Frame: 60 days 1. Patient demonstrates independence with her HEP without verbal cueing from therapist. 
2. Patient able to ambulate for 45 minutes without giving way in left knee. 3. Patient able to stand for 1 hour to perform work and home duties with pain no more than 0-2/10 in left knee. 4. Improve LEFS outcome measure score from 64/80 to 74/80 to perform daily tasks. Rehabilitation Potential For Stated Goals: Excellent Regarding Boo Briones's therapy, I certify that the treatment plan above will be carried out by a therapist or under their direction. Thank you for this referral, Jenniffer Kowalski PT The information in this section was collected on 1/14/19 (except where otherwise noted). HISTORY:  
History of Present Injury/Illness (Reason for Referral): Insidious onset of left knee pain that started 18 months ago. She has noted increased swelling in left knee after prolonged weight bearing, giving way in her knee 1-2x a week and increased pain levels with activities. She has tried cortisone injections, euflexxa injections and durolane injections, however has not received any relief. She is not a surgical candidate at this time. She is most challenged with prolonged standing and walking. She constantly feels pops and cracks in her left knee. Patient denies dizziness, drop attacks, numbness/tingling, bowel/bladder dysfunction and/or unexplained weight gain/loss. Current x-rays and MRI are negative, show arthritic changes. Past Medical History/Comorbidities: Ms. Kushal Armendariz  has a past medical history of Adhesive capsulitis of shoulder (10/18/2012) and Chronic pain.  She also has no past medical history of Aneurysm ([address] Arrhythmia, Asthma, Autoimmune disease ([address] CAD (coronary artery disease), Cancer ([address] Chronic kidney disease, Coagulation defects, COPD, Diabetes ([address] Difficult intubation, GERD (gastroesophageal reflux disease), Heart failure ([address] Hypertension, Liver disease, Malignant hyperthermia due to anesthesia, Morbid obesity ([address] Nausea & vomiting, Other ill-defined conditions(799.89), Pseudocholinesterase deficiency, Psychiatric disorder, PUD (peptic ulcer disease), Seizures ([address] Stroke ([address] Thromboembolus ([address] Thyroid disease, Unspecified adverse effect of anesthesia, or Unspecified sleep apnea. Ms. Lynn Blue  has a past surgical history that includes hx  section (); hx hysterectomy (); hx lap cholecystectomy (); and hx shoulder arthroscopy (). Social History/Living Environment:  
  Lives in a private home independently, challenged with prolonged weight bearing activities around the house and difficulty with stairs Prior Level of Function/Work/Activity: 
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 Dominant Side:  
      RIGHT Ambulatory/Rehab Services H2 Model Falls Risk Assessment Risk Factors: 
     No Risk Factors Identified Ability to Rise from Chair: 
     (1)  Pushes up, successful in one attempt Falls Prevention Plan: No modifications necessary Total: (5 or greater = High Risk): 1  [facility] [address] Patent #[id number]. Federal Law prohibits the replication, distribution or use without written permission from [facility] [employer] Current Medications:   
  
Current Outpatient Medications:  
  Naproxen-Esomeprazole Mag (VIMOVO) 500-20 mg TbID, Take 1 Tab by mouth two (2) times daily as needed. , Disp: , Rfl:  
  cyclobenzaprine (FLEXERIL) 10 mg tablet, Take 10 mg by mouth nightly., Disp: , Rfl:  
   multivitamin (ONE A DAY) tablet, Take 1 Tab by mouth daily. , Disp: , Rfl:  
  omega-3 fatty acids-vitamin e (FISH OIL) 1,000 mg cap, Take 1 Cap by mouth.  , Disp: , Rfl:  
  calcium-vitamin D (OYSTER SHELL) 500 mg(1,250mg) -200 unit per tablet, Take 1 Tab by mouth two (2) times daily (with meals). , Disp: , Rfl:   
Date Last Reviewed:  2/6/2019 Number of Personal Factors/Comorbidities that affect the Plan of Care: 1-2: MODERATE COMPLEXITY EXAMINATION:  
Observation/Orthostatic Postural Assessment:   
       Lower extremity weight bearing is slight increased right. Observation of gait indicates antalgic gait with limited stance phase left, bilateral pelvic rotation, left greater than right. Patient exhibits a increased lumbar lordosis and neutral thoracic kyphosis. Palpation of lower quadrant bony landmarks are left iliac crest elevated, level greater trochanters. Knee alignment is mild genu valgus and recurvatum left, left femur internally rotated. Observation of patellar position indicates lateral tilt. Patellar tracking with short knee bend indicates left knee tracts laterally. Medial longitudinal arch is slight elevated bilaterally. . Functional and full squat exhibits increased strain to anterior aspect of bilateral patella, no posterior weight distribution. Single leg stand exhibits increased left femur internal rotation. Soft tissue observation indicates restrictions in bilateral iliopsoas, left rectus femoris, bilateral hamstrings. Palpation: Patient exhibits tenderness to palpation left lateral knee. In modified Abdullahi position, muscle length of tensor fascia lázaro (TFL)  is restricted left; muscle length of iliopsoas is restricted bilateraly; and muscle length of rectus femoris is restricted left greater than right. Hamstring flexibility tested supine with straight leg raise is restricted bilaterally to 50 degrees.  Piriformis flexibility is restricted left greater than right. Quadriceps flexibility tested heel to buttock is restricted left greater than right. Muscle length of gastrocnemius is mild restrictions left. Muscle length of soleus is mild restrictions left. ROM:   Passive Accessory Mobility Testing: Patellofemoral mobility is limited in medial/lateral glides. AROM(PROM) Right Left Knee flexion 0-130 0-125 Knee extension 0 0 Hip flexion 110 100 Hip external rotation (ER) 25 20 Hip internal rotation (IR) 20 15 Hip extension 5 5 Strength:    
Manual Muscle Test (*/5) Right Left Knee extension 4 4-  
Knee flexion 4+ 4- Hip flexion 4 4- Hip ER 4 4 Hip IR 4 4 Hip extension 4- 3+ Hip abduction 4- 3+ Hip adduction 5 5 Ankle DF 5 5 Ankle PF 5 5 Special Tests:   
Ligament stress tests performed today of the left knee include: 
Valgus Stress Test at 0 and 30 degrees for medial instability: negative. Varus Stress Test at 0 and 30 degrees for lateral instability: negative. One-plane anterior stress for anterior instability: Lachman Test - negative. Meniscal testing performed today of the left knee include: Ally's - negative. Marthenia Bihari test is negative. Scour test is negative. Neurological Screen: Myotomes: Key muscle strength testing for bilateral LE is intact. Dermatomes: Sensation testing through bilateral LE for light touch is intact. Reflexes: Patellar (L4) and achilles (S1) are not tested today. Neural tension tests: Slump test is negative. Passive straight leg raise (SLR) test is negative. Functional Mobility:  Challenged with prolonged weight bearing, ambulation and ascending/descending stairs. Body Structures Involved: 1. Joints 2. Muscles Body Functions Affected: 1. Sensory/Pain 2. Neuromusculoskeletal 
3. Movement Related Activities and Participation Affected: 1. Mobility 2. Self Care 3. Interpersonal Interactions and Relationships 4. Community, Social and [place] Number of elements (examined above) that affect the Plan of Care: 3: MODERATE COMPLEXITY CLINICAL PRESENTATION:  
Presentation: Evolving clinical presentation with changing clinical characteristics: MODERATE COMPLEXITY CLINICAL DECISION MAKING:  
Outcome Measure: Tool Used: Lower Extremity Functional Scale (LEFS) Score:  Initial: 64/80 Most Recent: X/80 (Date: -- ) Interpretation of Score: 20 questions each scored on a 5 point scale with 0 representing \"extreme difficulty or unable to perform\" and 4 representing \"no difficulty\". The lower the score, the greater the functional disability. 80/80 represents no disability. Minimal detectable change is 9 points. Medical Necessity:  
· Patient is expected to demonstrate progress in strength, range of motion, balance, coordination and functional technique to increase independence with ability to perform more activities in weight bearing and ambulate without pain or giving way of her left LE. Sammi Tomlinson Reason for Services/Other Comments: 
· Patient continues to require skilled intervention due to challenged with daily left knee pain and limitations to perform daily tasks and ambulation secondary to pain and instability in left knee. Sammi Tomlinson Use of outcome tool(s) and clinical judgement create a POC that gives a: Questionable prediction of patient's progress: MODERATE COMPLEXITY  
  
 
 
 
TREATMENT:  
(In addition to Assessment/Re-Assessment sessions the following treatments were rendered) Pre-treatment Symptoms/Complaints:  Patient notes continues to have pain in left knee and along lateral thigh. Notes exercises are going well and plans to work independently over the next month to see how she is able to progress her strength. Pain: Initial:  
Pain Intensity 1: 5 Pain Location 1: Knee Pain Orientation 1: Left  Post Session:  4/10 Therapeutic Exercise: (30 Minutes):  Exercises per grid below to improve mobility, strength, balance and coordination. Required moderate verbal and manual cues to promote proper body alignment, promote proper body posture, promote proper body mechanics and promote proper body breathing techniques. Progressed resistance, range, repetitions and complexity of movement as indicated. Date: 
2/6/2019 Activity/Exercise Parameters Quadriceps sets X 5 reps 5 sec holds Straight leg raise X x 10 rep, 5 sec holds Bilateral hamstrings active stretch X 5 reps, 10 second ankle pumps. Kinesiotaping Bridging 2x10 reps, 5 sec holds Side lying clam shells 2x10 reps, 5 sec holds Standing forward weight shift X 10 reps, 5 sec holds Side lying hip abduction X 15 reps, 5 sec holds BLE Standing lateral steps with t-band X 5 reps, green band 1/2 prone hip extension X 20 reps, 5 sec holds, blue t-band Standing hip hinge hamstrings stretch X 5 reps 15 sec holds Patient education X 5 minute review of HEP, added new exercises and encouragement of walking and recumbent bicycle use. Discussed nutrition and weight loss. Standing hip flexion/single leg balance X 10 reps slow and controlled Standing 4 way hip X 15 reps BLE Manual Therapy (    Soft Tissue Mobilization Duration Duration: 25 Minutes): Manual techniques to facilitate improved motion and decreased pain. (Used abbreviations: MET - muscle energy technique; PNF - proprioceptive neuromuscular facilitation; NMR - neuromuscular re-education; a/p - anterior to posterior; p/a - posterior to anterior; FMP - functional movement patterns) · Supine left anterior quadriceps, superficial fascia with FMP of knee extension · Supine left patella mobilizations in all directions. · Supine bilateral iliopsoas release with FMP of knee extension · Supine left IT band and vastus lateralis soft tissue mobilization with FMP of knee flexion/extension · Side lying right for left IT band distraction with suction, rolfing techniques to lateral thigh. [employer] 
Treatment/Session Assessment:   
· Response to Treatment:  Demonstrates compliance and good techniques with her HEP, decreased restrictions noted in left IT band · Compliance with Program/Exercises: Compliant all of the time, Will assess as treatment progresses. · Recommendations/Intent for next treatment session: \"Next visit will focus on advancements to more challenging activities\". Patient will work independently for one month and will return to progress her program.  
Total Billable Treatment Duration: 55 minutes PT Patient Time In/Time Out Time In: 0730 Time Out: 0830 Adriane Richards, PT Future Appointments Date Time Provider Avery Jhaveri 3/7/2019  9:30 AM Ayesha ALCANTARA PT [facility] SANTANA  
4/4/2019  9:30 AM Mckayla Jose, PT [facility]

## 2019-03-07 ENCOUNTER — HOSPITAL ENCOUNTER (OUTPATIENT)
Dept: PHYSICAL THERAPY | Age: 49
Discharge: HOME | End: 2019-03-07
Payer: COMMERCIAL

## 2019-03-07 PROCEDURE — 97110 THERAPEUTIC EXERCISES: CPT

## 2019-03-07 PROCEDURE — 97140 MANUAL THERAPY 1/> REGIONS: CPT

## 2019-03-08 NOTE — PROGRESS NOTES
Martha Perry  : 1970  Primary: [facility]  Secondary:  Therapy Center at [address]  [phone]   [phone]        OUTPATIENT PHYSICAL THERAPY:Daily Note 3/7/2019   ICD-10: Treatment Diagnosis: pain in joint, left knee M25.562  ICD-10: Treatment Diagnosis: patellar tendonitis M76.52  ICD-10: Treatment Diagnosis: muscle weakness; generalized M62.81  ICD-10: Treatment Diagnosis: difficulty walking R26.2  Precautions/Allergies:   Patient has no known allergies. MD Orders: evaluate and treat MEDICAL/REFERRING DIAGNOSIS:  Left knee pain [M25.562]   DATE OF ONSET: 18 months  REFERRING PHYSICIAN: Alexa Rosales MD  RETURN PHYSICIAN APPOINTMENT: as needed     INITIAL ASSESSMENT:  Ms. Adin Husain is a 46 y.o. female who presents to physical therapy with chronic left knee pain over the past 18 months. She notes increased swelling and instability in her left knee. She has tried several rounds of injections, however no pain relief noted. 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 She would benefit from skilled physical therapy to improve her overall mobility and function with daily tasks. PROBLEM LIST (Impacting functional limitations):  1. Decreased Strength  2. Decreased ADL/Functional Activities  3. Decreased Transfer Abilities  4. Decreased Ambulation Ability/Technique  5. Decreased Balance  6. Increased Pain  7. Decreased Activity Tolerance  8. Decreased Flexibility/Joint Mobility INTERVENTIONS PLANNED: (Treatment may consist of any combination of the following)  1. Balance Exercise  2. Cold  3. Gait Training  4. Heat  5. Home Exercise Program (HEP)  6. Manual Therapy  7. Neuromuscular Re-education/Strengthening  8. Range of Motion (ROM)  9. Therapeutic Activites  10.  Therapeutic Exercise/Strengthening TREATMENT PLAN:  Effective Dates: 1/14/2019 TO 3/16/2019 (60 days). Frequency/Duration: 2 times a week for 60 Day(s)  GOALS: (Goals have been discussed and agreed upon with patient.)    Discharge Goals: Time Frame: 60 days  1. Patient demonstrates independence with her HEP without verbal cueing from therapist.  2. Patient able to ambulate for 45 minutes without giving way in left knee. 3. Patient able to stand for 1 hour to perform work and home duties with pain no more than 0-2/10 in left knee. 4. Improve LEFS outcome measure score from 64/80 to 74/80 to perform daily tasks. Rehabilitation Potential For Stated Goals: Excellent  Regarding Rajendra Alvarezford's therapy, I certify that the treatment plan above will be carried out by a therapist or under their direction. Thank you for this referral,  Beau Vences PT                 The information in this section was collected on 1/14/19 (except where otherwise noted). HISTORY:   History of Present Injury/Illness (Reason for Referral): Insidious onset of left knee pain that started 18 months ago. She has noted increased swelling in left knee after prolonged weight bearing, giving way in her knee 1-2x a week and increased pain levels with activities. She has tried cortisone injections, euflexxa injections and durolane injections, however has not received any relief. She is not a surgical candidate at this time. She is most challenged with prolonged standing and walking. She constantly feels pops and cracks in her left knee. Patient denies dizziness, drop attacks, numbness/tingling, bowel/bladder dysfunction and/or unexplained weight gain/loss. Current x-rays and MRI are negative, show arthritic changes. Past Medical History/Comorbidities:   Ms. Phuong Reyes  has a past medical history of Adhesive capsulitis of shoulder (10/18/2012) and Chronic pain.  She also has no past medical history of Aneurysm ([address] Arrhythmia, Asthma, Autoimmune disease ([address] CAD (coronary artery disease), Cancer ([facility] Chronic kidney disease, Coagulation defects, COPD, Diabetes ([address] Difficult intubation, GERD (gastroesophageal reflux disease), Heart failure ([address] Hypertension, Liver disease, Malignant hyperthermia due to anesthesia, Morbid obesity ([address] Nausea & vomiting, Other ill-defined conditions(799.89), Pseudocholinesterase deficiency, Psychiatric disorder, PUD (peptic ulcer disease), Seizures ([address] Stroke ([address] Thromboembolus ([facility] [address] [facility] Thyroid disease, Unspecified adverse effect of anesthesia, or Unspecified sleep apnea. Ms. Kushal Armendariz  has a past surgical history that includes hx  section (); hx hysterectomy (); hx lap cholecystectomy (); and hx shoulder arthroscopy (). Social History/Living Environment:     Lives in a private home independently, challenged with prolonged weight bearing activities around the house and difficulty with stairs  Prior Level of Function/Work/Activity:  Works full time, mostly from [employer], however once a week she is out in the field and needs to have correct footwear to go into facilities. Dominant Side:         RIGHT     Ambulatory/Rehab Services H2 Model Falls Risk Assessment    Risk Factors:       No Risk Factors Identified Ability to Rise from Chair:       (1)  Pushes up, successful in one attempt    Falls Prevention Plan:       No modifications necessary   Total: (5 or greater = High Risk): 1     [facility] [place]. All Rights Reserved. [facility] Patent #[id number]. Federal Law prohibits the replication, distribution or use without written permission from [facility] "[employer]     Current Medications:       Current Outpatient Medications:     Naproxen-Esomeprazole Mag (VIMOVO) 500-20 mg TbID, Take 1 Tab by mouth two (2) times daily as needed. , Disp: , Rfl:     cyclobenzaprine (FLEXERIL) 10 mg tablet, Take 10 mg by mouth nightly., Disp: , Rfl:     multivitamin (ONE A DAY) tablet, Take 1 Tab by mouth daily.  , Disp: , Rfl:     omega-3 fatty acids-vitamin e (FISH OIL) 1,000 mg cap, Take 1 Cap by mouth.  , Disp: , Rfl:     calcium-vitamin D (OYSTER SHELL) 500 mg(1,250mg) -200 unit per tablet, Take 1 Tab by mouth two (2) times daily (with meals). , Disp: , Rfl:    Date Last Reviewed:  3/7/2019   Number of Personal Factors/Comorbidities that affect the Plan of Care: 1-2: MODERATE COMPLEXITY   EXAMINATION:   Observation/Orthostatic Postural Assessment:           Lower extremity weight bearing is slight increased right. Observation of gait indicates antalgic gait with limited stance phase left, bilateral pelvic rotation, left greater than right. Patient exhibits a increased lumbar lordosis and neutral thoracic kyphosis. Palpation of lower quadrant bony landmarks are left iliac crest elevated, level greater trochanters. Knee alignment is mild genu valgus and recurvatum left, left femur internally rotated. Observation of patellar position indicates lateral tilt. Patellar tracking with short knee bend indicates left knee tracts laterally. Medial longitudinal arch is slight elevated bilaterally. . Functional and full squat exhibits increased strain to anterior aspect of bilateral patella, no posterior weight distribution. Single leg stand exhibits increased left femur internal rotation. Soft tissue observation indicates restrictions in bilateral iliopsoas, left rectus femoris, bilateral hamstrings. Palpation: Patient exhibits tenderness to palpation left lateral knee. In modified Abdullahi position, muscle length of tensor fascia lázaro (TFL)  is restricted left; muscle length of iliopsoas is restricted bilateraly; and muscle length of rectus femoris is restricted left greater than right. Hamstring flexibility tested supine with straight leg raise is restricted bilaterally to 50 degrees. Piriformis flexibility is restricted left greater than right.  Quadriceps flexibility tested heel to buttock is restricted left greater than right. Muscle length of gastrocnemius is mild restrictions left. Muscle length of soleus is mild restrictions left. ROM:   Passive Accessory Mobility Testing: Patellofemoral mobility is limited in medial/lateral glides. AROM(PROM) Right Left   Knee flexion 0-130 0-125   Knee extension 0 0   Hip flexion 110 100   Hip external rotation (ER) 25 20   Hip internal rotation (IR) 20 15   Hip extension 5 5     Strength:     Manual Muscle Test (*/5) Right Left   Knee extension 4 4-   Knee flexion 4+ 4-   Hip flexion 4 4-   Hip ER 4 4   Hip IR 4 4   Hip extension 4- 3+   Hip abduction 4- 3+   Hip adduction 5 5   Ankle DF 5 5   Ankle PF 5 5            Special Tests:    Ligament stress tests performed today of the left knee include:  Valgus Stress Test at 0 and 30 degrees for medial instability: negative. Varus Stress Test at 0 and 30 degrees for lateral instability: negative. One-plane anterior stress for anterior instability: Lachman Test - negative. Meniscal testing performed today of the left knee include: Ally's - negative. Reema Butler test is negative. Scour test is negative. Neurological Screen:  Myotomes: Key muscle strength testing for bilateral LE is intact. Dermatomes: Sensation testing through bilateral LE for light touch is intact. Reflexes: Patellar (L4) and achilles (S1) are not tested today. Neural tension tests: Slump test is negative. Passive straight leg raise (SLR) test is negative. Functional Mobility:  Challenged with prolonged weight bearing, ambulation and ascending/descending stairs. Body Structures Involved:  1. Joints  2. Muscles Body Functions Affected:  1. Sensory/Pain  2. Neuromusculoskeletal  3. Movement Related Activities and Participation Affected:  1. Mobility  2. Self Care  3. Interpersonal Interactions and Relationships  4.  Community, Social and [place]   Number of elements (examined above) that affect the Plan of Care: 3: MODERATE COMPLEXITY   CLINICAL PRESENTATION:   Presentation: Evolving clinical presentation with changing clinical characteristics: MODERATE COMPLEXITY   CLINICAL DECISION MAKING:   Outcome Measure: Tool Used: Lower Extremity Functional Scale (LEFS)  Score:  Initial: 64/80 Most Recent: X/80 (Date: -- )   Interpretation of Score: 20 questions each scored on a 5 point scale with 0 representing \"extreme difficulty or unable to perform\" and 4 representing \"no difficulty\". The lower the score, the greater the functional disability. 80/80 represents no disability. Minimal detectable change is 9 points. Medical Necessity:   · Patient is expected to demonstrate progress in strength, range of motion, balance, coordination and functional technique to increase independence with ability to perform more activities in weight bearing and ambulate without pain or giving way of her left LEFabby Mcduffie Reason for Services/Other Comments:  · Patient continues to require skilled intervention due to challenged with daily left knee pain and limitations to perform daily tasks and ambulation secondary to pain and instability in left knee. .   Use of outcome tool(s) and clinical judgement create a POC that gives a: Questionable prediction of patient's progress: MODERATE COMPLEXITY            TREATMENT:   (In addition to Assessment/Re-Assessment sessions the following treatments were rendered)  Pre-treatment Symptoms/Complaints:  Patient notes she has worked the past month independently and has noted improved mobility in her left knee and improved strength. However, she continues to have pain and discomfort with prolonged standing, especially while cooking in her kitchen. Pain: Initial:   Pain Intensity 1: 4  Pain Location 1: Knee  Pain Orientation 1: Left  Post Session:  3/10     Therapeutic Exercise: (25 Minutes):  Exercises per grid below to improve mobility, strength, balance and coordination.   Required moderate verbal and manual cues to promote proper body alignment, promote proper body posture, promote proper body mechanics and promote proper body breathing techniques. Progressed resistance, range, repetitions and complexity of movement as indicated. Date:  3/7/2019   Activity/Exercise Parameters   Quadriceps sets X 5 reps 5 sec holds   Straight leg raise X x 10 rep, 5 sec holds   Bilateral hamstrings active stretch X 5 reps, 10 second ankle pumps. Kinesiotaping    Bridging 2x10 reps, 5 sec holds   Side lying clam shells 2x10 reps, 5 sec holds   Standing forward weight shift X 10 reps, 5 sec holds   Side lying hip abduction X 15 reps, 5 sec holds BLE   Standing lateral steps with t-band X 5 reps, green band   1/2 prone hip extension X 20 reps, 5 sec holds, blue t-band   Standing hip hinge hamstrings stretch X 5 reps 15 sec holds   Patient education X 5 minute review of HEP, added new exercises and encouragement of walking and recumbent bicycle use. Discussed used of brace for support with ambulation. Standing hip flexion/single leg balance X 10 reps slow and controlled   Standing 4 way hip X 15 reps BLE         Manual Therapy (    Soft Tissue Mobilization Duration  Duration: 30 Minutes): Manual techniques to facilitate improved motion and decreased pain. (Used abbreviations: MET - muscle energy technique; PNF - proprioceptive neuromuscular facilitation; NMR - neuromuscular re-education; a/p - anterior to posterior; p/a - posterior to anterior; FMP - functional movement patterns)   · Supine left anterior quadriceps, superficial fascia with FMP of knee extension  · Supine left patella mobilizations in all directions. · Supine bilateral iliopsoas release with FMP of knee extension  · Supine left IT band and vastus lateralis soft tissue mobilization with FMP of knee flexion/extension  · Side lying right for left IT band distraction with suction, rolfing techniques to lateral thigh.     [facility] Portal  Treatment/Session Assessment:    · Response to Treatment:  Demonstrates improved mobility of her LLE, improving overall strength and endurance, will continue to work independently for 1 month and follow up to assess progression. · Compliance with Program/Exercises: Compliant all of the time, Will assess as treatment progresses. · Recommendations/Intent for next treatment session: \"Next visit will focus on advancements to more challenging activities\".  Patient will work independently for one month and will return to progress her program.   Total Billable Treatment Duration: 55 minutes  PT Patient Time In/Time Out  Time In: 0930  Time Out: [address], PT    Future Appointments   Date Time Provider Avery Jhaveri   4/4/2019  9:30 AM Lyndon Song., PT [facility]

## 2019-04-04 ENCOUNTER — APPOINTMENT (OUTPATIENT)
Dept: PHYSICAL THERAPY | Age: 49
End: 2019-04-04

## 2019-06-26 NOTE — THERAPY DISCHARGE
Eli Castaneda : 1970 Primary: [facility] Secondary:  Therapy Center at [address] [phone]   [phone] OUTPATIENT PHYSICAL THERAPY:Discontinuation Summary 19 ICD-10: Treatment Diagnosis: pain in joint, left knee M25.562 ICD-10: Treatment Diagnosis: patellar tendonitis M76.52 
ICD-10: Treatment Diagnosis: muscle weakness; generalized M62.81 ICD-10: Treatment Diagnosis: difficulty walking R26.2 Precautions/Allergies:  
Patient has no known allergies. MD Orders: evaluate and treat MEDICAL/REFERRING DIAGNOSIS: 
Left knee pain [M25.562] DATE OF ONSET: 18 months REFERRING PHYSICIAN: Skyler Reyes MD 
RETURN PHYSICIAN APPOINTMENT: as needed DISCONTINUATION NOTE: 19: As of 3/7/2019, Eli Castaneda has attended 6 out of 6 scheduled visits, with 0 cancellation(s) and 0 no shows. She has continued to work independently on her HEP and has not scheduled any further physical therapy visits. She will be discontinued from therapy at this time. INITIAL ASSESSMENT:  Ms. Lynn Blue is a 46 y.o. female who presents to physical therapy with chronic left knee pain over the past 18 months. She notes increased swelling and instability in her left knee. She has tried several rounds of injections, however no pain relief noted. 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 She would benefit from skilled physical therapy to improve her overall mobility and function with daily tasks. TREATMENT PLAN: 
Effective Dates: 2019 TO 3/16/2019 (60 days). Frequency/Duration: 2 times a week for 60 Day(s) GOALS: (Goals have been discussed and agreed upon with patient.) Discharge Goals: Time Frame: 60 days 1.  Patient demonstrates independence with her HEP without verbal cueing from therapist. GOAL MET 2. Patient able to ambulate for 45 minutes without giving way in left knee. NOT MET 3. Patient able to stand for 1 hour to perform work and home duties with pain no more than 0-2/10 in left knee. NOT MET 4. Improve LEFS outcome measure score from 64/80 to 74/80 to perform daily tasks. NOT MET Thank you for this referral, Jerel Feliz, PT